# Patient Record
Sex: FEMALE | Race: ASIAN | NOT HISPANIC OR LATINO | Employment: UNEMPLOYED | URBAN - METROPOLITAN AREA
[De-identification: names, ages, dates, MRNs, and addresses within clinical notes are randomized per-mention and may not be internally consistent; named-entity substitution may affect disease eponyms.]

---

## 2021-06-25 ENCOUNTER — OFFICE VISIT (OUTPATIENT)
Dept: OBGYN CLINIC | Facility: CLINIC | Age: 61
End: 2021-06-25

## 2021-06-25 VITALS — WEIGHT: 127 LBS | HEIGHT: 61 IN | BODY MASS INDEX: 23.98 KG/M2

## 2021-06-25 DIAGNOSIS — G89.29 CHRONIC BILATERAL LOW BACK PAIN WITH BILATERAL SCIATICA: Primary | ICD-10-CM

## 2021-06-25 DIAGNOSIS — M54.41 CHRONIC BILATERAL LOW BACK PAIN WITH BILATERAL SCIATICA: Primary | ICD-10-CM

## 2021-06-25 DIAGNOSIS — M54.42 CHRONIC BILATERAL LOW BACK PAIN WITH BILATERAL SCIATICA: Primary | ICD-10-CM

## 2021-06-25 PROCEDURE — 99204 OFFICE O/P NEW MOD 45 MIN: CPT | Performed by: PHYSICAL MEDICINE & REHABILITATION

## 2021-06-25 RX ORDER — ROSUVASTATIN CALCIUM 5 MG/1
5 TABLET, COATED ORAL DAILY
COMMUNITY

## 2021-06-25 RX ORDER — LIDOCAINE 50 MG/G
1 PATCH TOPICAL DAILY
Qty: 30 PATCH | Refills: 0 | Status: SHIPPED | OUTPATIENT
Start: 2021-06-25 | End: 2021-07-16 | Stop reason: SDUPTHER

## 2021-06-25 RX ORDER — NAPROXEN 500 MG/1
500 TABLET ORAL 2 TIMES DAILY PRN
Qty: 90 TABLET | Refills: 0 | Status: SHIPPED | OUTPATIENT
Start: 2021-06-25 | End: 2021-07-14

## 2021-06-25 RX ORDER — OLMESARTAN MEDOXOMIL 20 MG/1
20 TABLET ORAL DAILY
COMMUNITY

## 2021-06-25 RX ORDER — ESOMEPRAZOLE MAGNESIUM 10 MG/1
10 GRANULE, FOR SUSPENSION, EXTENDED RELEASE ORAL
COMMUNITY

## 2021-06-25 NOTE — PATIENT INSTRUCTIONS
Warm soaks with epsom salt can also help with muscle tightness/cramping  Epsom salt releases magnesium which can be helpful  Over-the-counter salonpas patches can be applied to the area of discomfort to help with pain  There are two types of patches; one contains lidocaine 4% and the other contains menthol, camphor and methyl salicylate  You can try one or the other and see which one works best for you  You may take over-the-counter pain medicine:    Acetaminophen (Tylenol) may be taken up to 1000 mg every 8 hours up to 3000 mg/day  An extra-strength Tylenol tablet is 500 mg  You can also take NSAID medication (NSAID stands for non-steroidal anti-inflammatory drug)  NSAIDs include ibuprofen (brand name Motrin or Advil) and naproxen (brand name Aleve)  Do not combine NSAIDs  For example, do not take ibuprofen and naproxen together  If you were prescribed an NSAID, do not take it with another NSAID  You may combine Acetaminophen (Tylenol) with one of the NSAIDs  You can obtain Voltaren (diclofenac) cream for your discomfort over the counter if it is not covered by insurance  This is now available over the counter at [employer] and online at [employer]  If it is more expensive to get it through prescription, it is better to get it over the counter  You can use this up to four times per day  It is best to wash the area with water and then pat dry  The cream absorbs better after this  Be careful not to let any pets or children get in contact with the cream as it is a medication and can be harmful to them  If you develop a skin reaction, stop using the cream     You will be starting physical therapy  It is important to do home exercises as given by your physical therapist as you go through PT to speed up your recovery  Physical therapy addresses the problems that are causing your pain, instead of covering them up, as some other treatment options do    We will see you back after completing physical therapy

## 2021-06-25 NOTE — PROGRESS NOTES
1  Chronic bilateral low back pain with bilateral sciatica  Ambulatory referral to Physical Therapy    naproxen (NAPROSYN) 500 mg tablet    lidocaine (LIDODERM) 5 %    Diclofenac Sodium (VOLTAREN) 1 %    MRI lumbar spine wo contrast     Orders Placed This Encounter   Procedures    MRI lumbar spine wo contrast    Ambulatory referral to Physical Therapy        Impression: This is a pleasant patient who presents with pain that is multifactorial and predominantly due to lumbar degenerative disc disease/lumbar radiculopathy  There are no concerning neurological deficits  Patient has had an extensive work up in her home country which has included x-rays and MRI of her lumbar spine from 2019  Her treatment so far has included physical therapy, anti-inflammatory medications, but she continues to have pain  We will obtain an updated MRI of her lumbar spine that she could be a candidate for interventional procedures  In the interim, she can restart physical therapy  I have also prescribed her naproxen that she can use as needed  She can use as with Tylenol  Of note, the patient will be returning to a different country in mid July  We discussed the risk of NSAID use including internal bleeding, increased blood pressure, increased risk of heart attack/stroke and worsening kidney function  Tylenol (acetaminophen) can be used with one of the NSAIDs or by itself, as long as no new liver problems and not drinking alcohol  Return in about 4 weeks (around 7/23/2021)  or earlier if symptoms worsen/change  Imaging Studies (I personally reviewed images in PACS and report if it was available):  MRI report from outside facility reviewed-this was from 2019  HPI:  Kim Miller is a 56 y o  female  who presents for evaluation of   Chief Complaint   Patient presents with    Lower Back - Pain       Onset/Mechanism: Chronic pain for many years after she fell out of a rickshaw    Location: Across the low back   Radiation: Down the leg, in the front and back  Quality: Aching  Provocative: Activity  Severity: Constant pain that waxes and wanes  Associated Symptoms: Denies  Treatment so far: Anti-inflammatories  Patient is accompanied by her  and son  Review of Systems   Constitutional: Positive for activity change  Negative for fever  HENT: Negative for trouble swallowing  Eyes: Negative for visual disturbance  Respiratory: Negative for shortness of breath  Cardiovascular: Negative for chest pain  Gastrointestinal: Negative for abdominal pain  Denies bowel incontinence  Endocrine: Negative for polydipsia  Genitourinary:        Denies urinary incontinence  Musculoskeletal: Positive for back pain  Negative for gait problem  Skin: Negative for rash  Allergic/Immunologic: Negative for immunocompromised state  Neurological: Negative for weakness and numbness  Denies saddle anesthesia  Hematological: Does not bruise/bleed easily  Psychiatric/Behavioral: Negative for confusion  No red flag symptoms including fever/chills, unintentional weight change, bowel/bladder incontinence, scissoring gait, personal/family history of cancer, pain worse at night, intravenous drug abuse or trauma  Following history reviewed and updated:  Past Medical History:   Diagnosis Date    [facility] mellitus ([facility] [address] )     High cholesterol     Hypertension      History reviewed  No pertinent surgical history  Social History   Social History     Substance and Sexual Activity   Alcohol Use Never     Social History     Substance and Sexual Activity   Drug Use Not on file     Social History     Tobacco Use   Smoking Status Never Smoker   Smokeless Tobacco Never Used     History reviewed  No pertinent family history  Allergies   Allergen Reactions    Cymbalta [Duloxetine Hcl] Hives        Constitutional:  Ht 5' 1" (1 549 m)   Wt 57 6 kg (127 lb)   BMI 24 00 kg/m²    General: NAD  Eyes: Anicteric sclerae  Neck: Supple  Lungs: Unlabored breathing  Cardiovascular: No lower extremity edema  Skin: Intact without erythema  Neurologic: Sensation intact to light touch  Psychiatric: Mood and affect are appropriate  Orthopedic Examination   Inspection: No obvious deformities, lesions or rashes   ROM: Within normal limits but causes pain in all directions   Palpation: R SI joint region  There are no step offs   Neuro: Bilateral extremity strength is normal and symmetric  No muscle atrophy or abnormal tone  Bilateral extremity muscle stretch reflexes are physiologic and symmetric   No myelopathic signs  Sensation to light touch is intact throughout except for left L5 dermatome has hypoesthesia  Neural compression testing: Normal bilateral SLR/dural stretch  Normal Raygoza's maneuver      Gait is normal     Procedures

## 2021-06-28 DIAGNOSIS — G89.29 CHRONIC BILATERAL LOW BACK PAIN WITH BILATERAL SCIATICA: ICD-10-CM

## 2021-06-28 DIAGNOSIS — M54.41 CHRONIC BILATERAL LOW BACK PAIN WITH BILATERAL SCIATICA: ICD-10-CM

## 2021-06-28 DIAGNOSIS — M54.42 CHRONIC BILATERAL LOW BACK PAIN WITH BILATERAL SCIATICA: ICD-10-CM

## 2021-06-28 RX ORDER — LIDOCAINE 50 MG/G
1 PATCH TOPICAL DAILY
Qty: 30 PATCH | Refills: 0 | Status: CANCELLED | OUTPATIENT
Start: 2021-06-28

## 2021-06-28 RX ORDER — NAPROXEN 500 MG/1
500 TABLET ORAL 2 TIMES DAILY PRN
Qty: 90 TABLET | Refills: 0 | Status: CANCELLED | OUTPATIENT
Start: 2021-06-28

## 2021-07-09 ENCOUNTER — HOSPITAL ENCOUNTER (OUTPATIENT)
Dept: MRI IMAGING | Facility: HOSPITAL | Age: 61
Discharge: HOME | End: 2021-07-09
Attending: PHYSICAL MEDICINE & REHABILITATION
Payer: COMMERCIAL

## 2021-07-09 DIAGNOSIS — G89.29 CHRONIC BILATERAL LOW BACK PAIN WITH BILATERAL SCIATICA: ICD-10-CM

## 2021-07-09 DIAGNOSIS — M54.41 CHRONIC BILATERAL LOW BACK PAIN WITH BILATERAL SCIATICA: ICD-10-CM

## 2021-07-09 DIAGNOSIS — M54.42 CHRONIC BILATERAL LOW BACK PAIN WITH BILATERAL SCIATICA: ICD-10-CM

## 2021-07-09 PROCEDURE — 72148 MRI LUMBAR SPINE W/O DYE: CPT

## 2021-07-09 PROCEDURE — G1004 CDSM NDSC: HCPCS

## 2021-07-14 ENCOUNTER — OFFICE VISIT (OUTPATIENT)
Dept: OBGYN CLINIC | Facility: MEDICAL CENTER | Age: 61
End: 2021-07-14

## 2021-07-14 VITALS
HEIGHT: 61 IN | HEART RATE: 62 BPM | DIASTOLIC BLOOD PRESSURE: 83 MMHG | WEIGHT: 127 LBS | SYSTOLIC BLOOD PRESSURE: 126 MMHG | BODY MASS INDEX: 23.98 KG/M2

## 2021-07-14 DIAGNOSIS — M54.42 CHRONIC BILATERAL LOW BACK PAIN WITH BILATERAL SCIATICA: Primary | ICD-10-CM

## 2021-07-14 DIAGNOSIS — M54.41 CHRONIC BILATERAL LOW BACK PAIN WITH BILATERAL SCIATICA: Primary | ICD-10-CM

## 2021-07-14 DIAGNOSIS — G89.29 CHRONIC BILATERAL LOW BACK PAIN WITH BILATERAL SCIATICA: Primary | ICD-10-CM

## 2021-07-14 PROCEDURE — 99213 OFFICE O/P EST LOW 20 MIN: CPT | Performed by: PHYSICAL MEDICINE & REHABILITATION

## 2021-07-14 RX ORDER — IBUPROFEN 800 MG/1
TABLET ORAL EVERY 6 HOURS PRN
COMMUNITY

## 2021-07-14 RX ORDER — NAPROXEN 500 MG/1
500 TABLET ORAL 2 TIMES DAILY PRN
Qty: 90 TABLET | Refills: 0 | Status: SHIPPED | OUTPATIENT
Start: 2021-07-14 | End: 2021-08-03

## 2021-07-14 NOTE — PROGRESS NOTES
1  Chronic bilateral low back pain with bilateral sciatica  naproxen (NAPROSYN) 500 mg tablet     No orders of the defined types were placed in this encounter  Impression:  Patient is here in follow up of low back that is multifactorial and predominantly due to lumbar degenerative disc disease/lumbar radiculopathy  There are no concerning neurological deficits      Patient has had an extensive work up in her home country which has included x-rays and MRI of her lumbar spine from 2019        Her treatment so far has included physical therapy, anti-inflammatory medications, but she continues to have pain      We will obtain an updated MRI of her lumbar spine that she could be a candidate for interventional procedures  Patient was not able to start physical therapy  She was not able to obtain the naproxen that I had previously prescribed her  I prescribed this medication again and we had a lengthy discussion about possible side effects  The patient will obtain the records as they will be heading back to their country, [place]      I will see them back if needed  We discussed red flag signs and symptoms  The patient and her  are in agreement with the above plan  Imaging Studies (I personally reviewed images in PACS and report):  Please see my initial note  No follow-ups on file  HPI:  Gunnar Hendricks is a 56 y o  female  who presents in follow up  Here for   Chief Complaint   Patient presents with    Spine - Follow-up       Date of injury: Chronic pain for many years after she fell out of a rickshaw  Trajectory of symptoms:  No change since last visit  Patient is accompanied by her   Review of Systems   Constitutional: Positive for activity change  Negative for fever  HENT: Negative for trouble swallowing  Eyes: Negative for visual disturbance  Respiratory: Negative for shortness of breath  Cardiovascular: Negative for chest pain     Gastrointestinal: Negative for abdominal pain  Denies bowel incontinence  Endocrine: Negative for polydipsia  Genitourinary:        Denies urinary incontinence  Musculoskeletal: Positive for back pain  Negative for gait problem  Skin: Negative for rash  Allergic/Immunologic: Negative for immunocompromised state  Neurological: Negative for weakness and numbness  Denies saddle anesthesia  Hematological: Does not bruise/bleed easily  Psychiatric/Behavioral: Negative for confusion  Following history reviewed and updated:  Past Medical History:   Diagnosis Date    [facility] mellitus [facility] [address] [facility]     High cholesterol     Hypertension      History reviewed  No pertinent surgical history  Social History   Social History     Substance and Sexual Activity   Alcohol Use Never     Social History     Substance and Sexual Activity   Drug Use Not on file     Social History     Tobacco Use   Smoking Status Never Smoker   Smokeless Tobacco Never Used     History reviewed  No pertinent family history  Allergies   Allergen Reactions    Cymbalta [Duloxetine Hcl] Hives        Constitutional:  /83 (BP Location: Right arm, Patient Position: Sitting, Cuff Size: Standard)   Pulse 62   Ht 5' 1" (1 549 m)   Wt 57 6 kg (127 lb)   BMI 24 00 kg/m²    General: NAD  Eyes: Clear sclerae  ENT: No inflammation, lesion, or mass of lips  No tracheal deviation  Musculoskeletal: As mentioned below  Integumentary: No visible rashes or skin lesions  Pulmonary/Chest: Effort normal  No respiratory distress  Neuro: CN's grossly intact, GALEANA  Psych: Normal affect and judgement  Vascular: WWP  Back Exam     Tenderness   The patient is experiencing tenderness in the sacroiliac  Muscle Strength   The patient has normal back strength      Tests   Straight leg raise right: negative  Straight leg raise left: negative    Reflexes   Patellar: normal  Achilles: 1/4    Other   Sensation: normal  Gait: normal   Erythema: no back redness  Scars: absent             Procedures

## 2021-07-16 ENCOUNTER — TELEPHONE (OUTPATIENT)
Dept: OBGYN CLINIC | Facility: HOSPITAL | Age: 61
End: 2021-07-16

## 2021-07-16 DIAGNOSIS — G89.29 CHRONIC BILATERAL LOW BACK PAIN WITH BILATERAL SCIATICA: ICD-10-CM

## 2021-07-16 DIAGNOSIS — M54.42 CHRONIC BILATERAL LOW BACK PAIN WITH BILATERAL SCIATICA: ICD-10-CM

## 2021-07-16 DIAGNOSIS — M54.41 CHRONIC BILATERAL LOW BACK PAIN WITH BILATERAL SCIATICA: ICD-10-CM

## 2021-07-16 RX ORDER — LIDOCAINE 50 MG/G
1 PATCH TOPICAL DAILY
Qty: 30 PATCH | Refills: 0 | Status: SHIPPED | OUTPATIENT
Start: 2021-07-16

## 2021-07-21 NOTE — TELEPHONE ENCOUNTER
Patient's son called stating diclofenac and lidocaine haven't been received by [facility] [address]  They are requesting these be refilled      Call back # [phone]

## 2021-07-23 ENCOUNTER — OFFICE VISIT (OUTPATIENT)
Dept: OBGYN CLINIC | Facility: CLINIC | Age: 61
End: 2021-07-23

## 2021-07-23 VITALS
HEART RATE: 82 BPM | DIASTOLIC BLOOD PRESSURE: 86 MMHG | HEIGHT: 61 IN | BODY MASS INDEX: 25.04 KG/M2 | SYSTOLIC BLOOD PRESSURE: 135 MMHG | WEIGHT: 132.6 LBS

## 2021-07-23 DIAGNOSIS — N76.0 BV (BACTERIAL VAGINOSIS): Primary | ICD-10-CM

## 2021-07-23 DIAGNOSIS — B96.89 BV (BACTERIAL VAGINOSIS): Primary | ICD-10-CM

## 2021-07-23 DIAGNOSIS — Z12.31 ENCOUNTER FOR SCREENING MAMMOGRAM FOR MALIGNANT NEOPLASM OF BREAST: ICD-10-CM

## 2021-07-23 PROCEDURE — 99203 OFFICE O/P NEW LOW 30 MIN: CPT | Performed by: OBSTETRICS & GYNECOLOGY

## 2021-07-23 RX ORDER — METRONIDAZOLE 500 MG/1
500 TABLET ORAL EVERY 12 HOURS SCHEDULED
Qty: 10 TABLET | Refills: 0 | Status: SHIPPED | OUTPATIENT
Start: 2021-07-23 | End: 2021-07-28

## 2021-07-23 NOTE — ASSESSMENT & PLAN NOTE
5-6 days of yellow to light brown vaginal discharge, foul smelling  - Wet prep showed no hyphae, + clue cells  - On exam: vaginal rugae normal, no signs of low estrogen state  - Will provide Rx for Flagyl for 5 days  - Discussed etiology and treatment of BV with the patient and her sister-in-law who helped to translate  - Follow-up if worsening or no resolution

## 2021-07-23 NOTE — PROGRESS NOTES
Assessment/Plan:    BV (bacterial vaginosis)  5-6 days of yellow to light brown vaginal discharge, foul smelling  - Wet prep showed no hyphae, + clue cells  - On exam: vaginal rugae normal, no signs of low estrogen state  - Will provide Rx for Flagyl for 5 days  - Discussed etiology and treatment of BV with the patient and her sister-in-law who helped to translate  - Follow-up if worsening or no resolution       Diagnoses and all orders for this visit:    BV (bacterial vaginosis)  -     metroNIDAZOLE (FLAGYL) 500 mg tablet; Take 1 tablet (500 mg total) by mouth every 12 (twelve) hours for 5 days        Subjective:      Patient ID: Linda Cornelius is a 56 y o  female  5-6 days of coffee colored vaginal discharge which is not persistent anymore  She does not have vaginal pain, burning or itching  No dysuria either  She had a hysterectomy in 2007 and hasn't had any abnormal discharge before then  She is not currently sexually active but had previously been with 1 male partner  She denies any abdominal or pelvic pain  She has not placed anything into the vagina and does not douche  She has not changed soaps or detergents either  The following portions of the patient's history were reviewed and updated as appropriate: allergies, current medications, past family history, past medical history, past social history, past surgical history and problem list     Review of Systems   Constitutional: Negative  HENT: Negative  Respiratory: Negative  Cardiovascular: Negative  Gastrointestinal: Positive for constipation  Negative for abdominal distention, abdominal pain and diarrhea  Genitourinary: Negative for difficulty urinating and dysuria  Musculoskeletal: Negative  Neurological: Negative  Psychiatric/Behavioral: Negative            Objective:      /86 (BP Location: Left arm, Patient Position: Sitting, Cuff Size: Adult)   Pulse 82   Ht 5' 1" (1 549 m)   Wt 60 1 kg (132 lb 9 6 oz)   BMI 25 05 kg/m²          Physical Exam  Exam conducted with a chaperone present  Constitutional:       Appearance: Normal appearance  HENT:      Head: Normocephalic and atraumatic  Pulmonary:      Effort: Pulmonary effort is normal    Genitourinary:     Exam position: Supine  Vagina: Vaginal discharge (yellow mucoid) present  Neurological:      General: No focal deficit present  Mental Status: She is alert  Mental status is at baseline     Psychiatric:         Mood and Affect: Mood normal          Behavior: Behavior normal

## 2021-08-03 ENCOUNTER — OFFICE VISIT (OUTPATIENT)
Dept: OBGYN CLINIC | Facility: CLINIC | Age: 61
End: 2021-08-03

## 2021-08-03 VITALS
HEIGHT: 61 IN | HEART RATE: 84 BPM | SYSTOLIC BLOOD PRESSURE: 122 MMHG | DIASTOLIC BLOOD PRESSURE: 78 MMHG | BODY MASS INDEX: 25.11 KG/M2 | WEIGHT: 133 LBS

## 2021-08-03 DIAGNOSIS — N64.4 BREAST TENDERNESS: Primary | ICD-10-CM

## 2021-08-03 PROBLEM — B96.89 BV (BACTERIAL VAGINOSIS): Status: RESOLVED | Noted: 2021-07-23 | Resolved: 2021-08-03

## 2021-08-03 PROBLEM — N76.0 BV (BACTERIAL VAGINOSIS): Status: RESOLVED | Noted: 2021-07-23 | Resolved: 2021-08-03

## 2021-08-03 PROCEDURE — 99203 OFFICE O/P NEW LOW 30 MIN: CPT | Performed by: NURSE PRACTITIONER

## 2021-08-03 NOTE — PROGRESS NOTES
PROBLEM GYNECOLOGICAL VISIT    René Mcfarlane is a 56 y o  female who presents today with complaint of breast tenderness  Her general medical history has been reviewed and she reports it as follows:    Past Medical History:   Diagnosis Date    Asthma     Coronary artery disease     Diabetes mellitus ([address] )     History of transfusion     postpartum    Hyperlipidemia     Hypertension      Past Surgical History:   Procedure Laterality Date    BREAST LUMPECTOMY Left      SECTION  1981, 200    HYSTERECTOMY  2007    for AUB    KNEE SURGERY Right      OB History        3    Para   2    Term   2       0    AB   1    Living   2       SAB   1    TAB   0    Ectopic   0    Multiple   0    Live Births   2               Social History     Tobacco Use    Smoking status: Never Smoker    Smokeless tobacco: Never Used   Vaping Use    Vaping Use: Never used   Substance Use Topics    Alcohol use: Never    Drug use: Never       Current Outpatient Medications   Medication Instructions    Diclofenac Sodium (VOLTAREN) 2 g, Topical, 3 times daily PRN    esomeprazole (NEXIUM) 10 mg, Oral, Every morning before breakfast    ibuprofen (MOTRIN) 800 mg tablet Oral, Every 6 hours PRN    lidocaine (LIDODERM) 5 % 1 patch, Topical, Daily, Remove & Discard patch within 12 hours or as directed   linaGLIPtin 5 mg, Oral, Daily    olmesartan (BENICAR) 20 mg, Oral, Daily    rosuvastatin (CRESTOR) 5 mg, Oral, Daily       History of Present Illness:   Reports has been experiencing bilateral breast tenderness in upper outer quadrant off/on for past few months  She states this occurs several times/month and that she notices a lump in the tender area at the same time as the tenderness  Denies any current lumps or tenderness  She reports that her last mammogram was performed in 2018 and was normal - no report available    She additionally advises me that she is going to [place] in 3 days and is unsure when she will be returning to the [place]  Review of Systems:  Review of Systems   Constitutional: Negative  Gastrointestinal: Negative  Genitourinary:        Bilateral breast tenderness       Physical Exam:  /78   Pulse 84   Ht 5' 1" (1 549 m)   Wt 60 3 kg (133 lb)   BMI 25 13 kg/m²   Physical Exam  Constitutional:       General: She is not in acute distress  Chest:      Breasts:         Right: Normal  No mass, nipple discharge, skin change or tenderness  Left: Normal  No mass, nipple discharge, skin change or tenderness  Neurological:      Mental Status: She is alert  Skin:     General: Skin is warm and dry  Vitals reviewed  Assessment:   1  Bilateral breast tenderness  Plan:   1  Imaging ordered: bilateral diagnostic mammogram and breast ultrasound  2  Return to office to review results after imaging performed

## 2021-08-04 ENCOUNTER — TELEPHONE (OUTPATIENT)
Dept: OBGYN CLINIC | Facility: CLINIC | Age: 61
End: 2021-08-04

## 2021-09-20 ENCOUNTER — TELEPHONE (OUTPATIENT)
Dept: OBGYN CLINIC | Facility: CLINIC | Age: 61
End: 2021-09-20